# Patient Record
Sex: MALE | Race: WHITE | NOT HISPANIC OR LATINO | Employment: FULL TIME | URBAN - METROPOLITAN AREA
[De-identification: names, ages, dates, MRNs, and addresses within clinical notes are randomized per-mention and may not be internally consistent; named-entity substitution may affect disease eponyms.]

---

## 2018-04-06 ENCOUNTER — HOSPITAL ENCOUNTER (EMERGENCY)
Facility: HOSPITAL | Age: 33
Discharge: HOME | End: 2018-04-06
Attending: EMERGENCY MEDICINE

## 2018-04-06 VITALS
DIASTOLIC BLOOD PRESSURE: 66 MMHG | RESPIRATION RATE: 18 BRPM | BODY MASS INDEX: 23.38 KG/M2 | WEIGHT: 167 LBS | TEMPERATURE: 98 F | SYSTOLIC BLOOD PRESSURE: 116 MMHG | HEART RATE: 82 BPM | HEIGHT: 71 IN | OXYGEN SATURATION: 99 %

## 2018-04-06 DIAGNOSIS — S82.424A CLOSED NONDISPLACED TRANSVERSE FRACTURE OF SHAFT OF RIGHT FIBULA, INITIAL ENCOUNTER: Primary | ICD-10-CM
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
## 2018-04-06 PROCEDURE — 29515 APPLICATION SHORT LEG SPLINT: CPT | Mod: RT

## 2018-04-06 PROCEDURE — 99283 EMERGENCY DEPT VISIT LOW MDM: CPT | Mod: 25

## 2018-04-06 RX ORDER — KETOROLAC TROMETHAMINE 30 MG/ML
60 INJECTION, SOLUTION INTRAMUSCULAR; INTRAVENOUS
Status: DISCONTINUED | OUTPATIENT
Start: 2018-04-06 | End: 2018-04-06 | Stop reason: HOSPADM

## 2018-04-06 RX ORDER — HYDROCODONE BITARTRATE AND ACETAMINOPHEN 10; 325 MG/1; MG/1
1 TABLET ORAL EVERY 4 HOURS PRN
Qty: 18 TABLET | Refills: 0 | Status: SHIPPED | OUTPATIENT
Start: 2018-04-06

## 2018-04-06 NOTE — ED PROVIDER NOTES
"Encounter Date: 4/6/2018       History     Chief Complaint   Patient presents with    Ankle Pain     Pt reports was attempting to ride hoverboard, felt twisting and "crunches" to right ankle when he went to step off of board.   + swelling noted to right ankle. + distal sensations to right foot. Pt states unable to bear weight, + able to move right ankle.      The patient was riding off of a board earlier today.  He states he lost his balance and fell injuring his right ankle.  He states that he cannot walk on it due to pain.      The history is provided by the patient.   Leg Pain    The incident occurred at home. The injury mechanism was a fall. The incident occurred today. The pain is present in the right ankle. The quality of the pain is described as sharp and throbbing. The pain is at a severity of 6/10. The pain has been constant since onset. Associated symptoms include inability to bear weight. Pertinent negatives include no numbness, no loss of motion, no muscle weakness, no loss of sensation and no tingling. He reports no foreign bodies present. The symptoms are aggravated by bearing weight. He has tried nothing for the symptoms.     Review of patient's allergies indicates:  No Known Allergies  Past Medical History:   Diagnosis Date    Asthma      History reviewed. No pertinent surgical history.  Family History   Problem Relation Age of Onset    No Known Problems Mother     Heart disease Father      Social History   Substance Use Topics    Smoking status: Never Smoker    Smokeless tobacco: Never Used    Alcohol use No     Review of Systems   Musculoskeletal: Positive for arthralgias.   Neurological: Negative for tingling and numbness.   All other systems reviewed and are negative.      Physical Exam     Initial Vitals [04/06/18 1653]   BP Pulse Resp Temp SpO2   116/66 82 18 97.9 °F (36.6 °C) 99 %      MAP       82.67         Physical Exam    Nursing note and vitals reviewed.  Constitutional: He appears " well-developed and well-nourished.   HENT:   Head: Normocephalic and atraumatic.   Eyes: EOM are normal.   Neck: Normal range of motion. Neck supple.   Cardiovascular: Normal rate, regular rhythm, normal heart sounds and intact distal pulses.   Pulmonary/Chest: Breath sounds normal.   Abdominal: Soft.   Musculoskeletal: Normal range of motion.        Feet:    Neurological: He is alert.   Skin: Skin is warm and dry. Capillary refill takes less than 2 seconds.   Psychiatric: He has a normal mood and affect. His behavior is normal. Judgment and thought content normal.         ED Course   Procedures  Labs Reviewed - No data to display                               Clinical Impression:   The primary encounter diagnosis was Closed nondisplaced transverse fracture of shaft of right fibula, initial encounter. A diagnosis of Fall was also pertinent to this visit.                           Darren Berry MD  04/06/18 1330

## 2024-09-13 ENCOUNTER — OFFICE VISIT (OUTPATIENT)
Dept: UROLOGY | Facility: CLINIC | Age: 39
End: 2024-09-13
Payer: COMMERCIAL

## 2024-09-13 VITALS
WEIGHT: 177 LBS | DIASTOLIC BLOOD PRESSURE: 78 MMHG | HEART RATE: 85 BPM | HEIGHT: 71 IN | BODY MASS INDEX: 24.78 KG/M2 | SYSTOLIC BLOOD PRESSURE: 127 MMHG

## 2024-09-13 DIAGNOSIS — Z30.09 VASECTOMY EVALUATION: Primary | ICD-10-CM

## 2024-09-13 PROCEDURE — 99999 PR PBB SHADOW E&M-NEW PATIENT-LVL III: CPT | Mod: PBBFAC,,, | Performed by: UROLOGY

## 2024-09-13 RX ORDER — SULFAMETHOXAZOLE AND TRIMETHOPRIM 800; 160 MG/1; MG/1
1 TABLET ORAL 2 TIMES DAILY
Qty: 10 TABLET | Refills: 0 | Status: SHIPPED | OUTPATIENT
Start: 2024-09-13

## 2024-09-13 RX ORDER — DIAZEPAM 5 MG/1
5 TABLET ORAL ONCE
Qty: 1 TABLET | Refills: 0 | Status: SHIPPED | OUTPATIENT
Start: 2024-09-13 | End: 2024-09-13

## 2024-09-13 RX ORDER — OXYCODONE AND ACETAMINOPHEN 5; 325 MG/1; MG/1
1 TABLET ORAL EVERY 4 HOURS PRN
Qty: 25 TABLET | Refills: 0 | Status: CANCELLED | OUTPATIENT
Start: 2024-09-13

## 2024-09-13 RX ORDER — PROMETHAZINE HYDROCHLORIDE 25 MG/1
25 TABLET ORAL ONCE
Qty: 1 TABLET | Refills: 0 | Status: SHIPPED | OUTPATIENT
Start: 2024-09-13 | End: 2024-09-13

## 2024-09-13 NOTE — PROGRESS NOTES
HPI:    Mr. Keyes is a 38 y.o. male who presents for evaluation re: vasectomy. He has 3 children, he is certain that he desires no more. He is aware of other forms of contraception. He is aware that vasectomy is to be considered permanent/irreversible.    PATIENT HISTORY:    Past Medical History:   Diagnosis Date    Asthma        History reviewed. No pertinent surgical history.    Family History   Problem Relation Name Age of Onset    No Known Problems Mother      Heart disease Father         Social History     Socioeconomic History    Marital status:    Tobacco Use    Smoking status: Never    Smokeless tobacco: Never   Substance and Sexual Activity    Alcohol use: No    Drug use: No       Medications: Per Epic List    Allergies:  Patient has no known allergies.    Review of Systems:  General: No fever, chills, fatigability, or weight loss.  Skin: No rashes, itching, or changes in color or texture of skin.  Chest: Denies MUÑOZ, cyanosis, wheezing, cough, and sputum production.  Abdomen: Appetite fine. No weight loss. Denies diarrhea, abdominal pain, hematemesis, or blood in stool.  Musculoskeletal: No joint stiffness or swelling. Denies back pain.  : As above.  All other review of systems negative.    PHYSICAL EXAM:    General appearance: Well-developed, well-nourished male in no apparent distress.  Mental status: Alert and oriented. Cooperative with normal affect.  Neuro: Motor and sensory exams grossly intact.  HEENT: Normocephalic. Atraumatic.  Neck: Normal ROM.  Chest: Non labored breathing.  Heart: Regular rate and rhythm.  Abdomen: Soft, non-distended, non-tender. No masses or organomegaly. No evidence of inguinal hernia.  Genitourinary: Penis is normal with no evidence of abnormal masses or tenderness. Epididymis, vas deferens, and cord structures are normal bilaterally.  Extremities: No cyanosis, clubbing, or edema.    IMPRESSION / PLAN:    Camden Keyes Jr. is a 38 y.o. male who presents for  evaluation for a vasectomy.    I discussed with the patient risks and benefits, as well as alternatives. We discussed possible complications at length, including fever, infection, bleeding (possibly requiring re-operation), testicular injury or atrophy with loss of function, chronic pain, persistent or recurrent presence of sperm in the ejaculate, or vasal recanalization, as well as the risks attendant to the anesthetic.  We discussed that he should stop aspirin, ibuprofen, and similar products at least one week prior to the procedure. Acetaminophen is okay to use for headaches, pain, etc.  He should bring an athletic supporter and loose-fitting sweat pants on the day of the procedure.  We discussed that he must continue to use contraception until two consecutive semen analyses (checked at approximately 2-3 months post-vasectomy) reveal azoospermia.    He will schedule an elective vasectomy.

## 2024-10-21 ENCOUNTER — TELEPHONE (OUTPATIENT)
Dept: UROLOGY | Facility: CLINIC | Age: 39
End: 2024-10-21
Payer: COMMERCIAL

## 2024-11-22 ENCOUNTER — TELEPHONE (OUTPATIENT)
Dept: UROLOGY | Facility: CLINIC | Age: 39
End: 2024-11-22
Payer: COMMERCIAL

## 2024-11-22 NOTE — TELEPHONE ENCOUNTER
Called patient and rescheduled him per patient's request.      ----- Message from [employer] sent at 11/22/2024  9:11 AM CST -----  Contact: Camden  .Type:  Needs Reschedule     Who Called:  Camden     Would the patient rather a call back or a response via [facility]? Call back   Best Call Back Number:  [phone] (home)    Additional Information:  Pt is calling in regards to the procedure scheduled on 11/29 and would like to get a call back to discuss rescheduling the appt     Thanks

## 2025-01-03 ENCOUNTER — PROCEDURE VISIT (OUTPATIENT)
Dept: UROLOGY | Facility: CLINIC | Age: 40
End: 2025-01-03
Payer: COMMERCIAL

## 2025-01-03 DIAGNOSIS — Z30.09 VASECTOMY EVALUATION: Primary | ICD-10-CM

## 2025-03-05 ENCOUNTER — CLINICAL SUPPORT (OUTPATIENT)
Dept: UROLOGY | Facility: CLINIC | Age: 40
End: 2025-03-05
Payer: COMMERCIAL

## 2025-03-05 DIAGNOSIS — Z30.09 VASECTOMY EVALUATION: Primary | ICD-10-CM

## 2025-03-05 NOTE — PROGRESS NOTES
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 Patient verbalized understanding.

## 2025-03-26 ENCOUNTER — CLINICAL SUPPORT (OUTPATIENT)
Dept: UROLOGY | Facility: CLINIC | Age: 40
End: 2025-03-26
Payer: COMMERCIAL

## 2025-03-26 DIAGNOSIS — Z30.09 VASECTOMY EVALUATION: Primary | ICD-10-CM
